# Patient Record
Sex: FEMALE | Race: WHITE | URBAN - METROPOLITAN AREA
[De-identification: names, ages, dates, MRNs, and addresses within clinical notes are randomized per-mention and may not be internally consistent; named-entity substitution may affect disease eponyms.]

---

## 2022-11-08 ENCOUNTER — OFFICE VISIT (OUTPATIENT)
Dept: URBAN - METROPOLITAN AREA CLINIC 10 | Facility: CLINIC | Age: 47
End: 2022-11-08
Payer: COMMERCIAL

## 2022-11-08 DIAGNOSIS — H52.4 PRESBYOPIA: ICD-10-CM

## 2022-11-08 DIAGNOSIS — H04.123 DRY EYE SYNDROME OF BILATERAL LACRIMAL GLANDS: ICD-10-CM

## 2022-11-08 DIAGNOSIS — H53.8 OTHER VISUAL DISTURBANCES: ICD-10-CM

## 2022-11-08 DIAGNOSIS — H53.2 DIPLOPIA: Primary | ICD-10-CM

## 2022-11-08 PROCEDURE — 92004 COMPRE OPH EXAM NEW PT 1/>: CPT | Performed by: STUDENT IN AN ORGANIZED HEALTH CARE EDUCATION/TRAINING PROGRAM

## 2022-11-08 ASSESSMENT — INTRAOCULAR PRESSURE
OS: 16
OD: 16

## 2022-11-08 ASSESSMENT — VISUAL ACUITY
OD: 20/25
OS: 20/25

## 2022-11-08 NOTE — IMPRESSION/PLAN
Impression: Dry eye syndrome of bilateral lacrimal glands: H04.123. Plan: Patient educated on signs and symptoms of dry eye and importance of environmental factors.  Discussed using OTC AT's with patient GLORIA MARTIN long-term

## 2022-11-08 NOTE — IMPRESSION/PLAN
Impression: Diplopia: H53.2. Plan: Large exophoria at dist, fused with 6BI OD in office. Diplopia is intermittent more at end of day.  Will have patient RTC for refraction to confirm prism

## 2022-11-08 NOTE — IMPRESSION/PLAN
Impression: Other visual disturbances: H53.8. Plan: Patient reports fall months ago, diplopia followed, being followed by neurology with no significant findings. 

RTC for 30-2HVF NA